# Patient Record
Sex: MALE | Race: WHITE | NOT HISPANIC OR LATINO | Employment: OTHER | URBAN - METROPOLITAN AREA
[De-identification: names, ages, dates, MRNs, and addresses within clinical notes are randomized per-mention and may not be internally consistent; named-entity substitution may affect disease eponyms.]

---

## 2022-11-10 ENCOUNTER — OFFICE VISIT (OUTPATIENT)
Dept: PRIMARY CARE CLINIC | Facility: CLINIC | Age: 32
End: 2022-11-10
Payer: COMMERCIAL

## 2022-11-10 VITALS
DIASTOLIC BLOOD PRESSURE: 80 MMHG | OXYGEN SATURATION: 97 % | HEART RATE: 85 BPM | WEIGHT: 192 LBS | BODY MASS INDEX: 26.88 KG/M2 | SYSTOLIC BLOOD PRESSURE: 116 MMHG | TEMPERATURE: 99 F | HEIGHT: 71 IN | RESPIRATION RATE: 18 BRPM

## 2022-11-10 DIAGNOSIS — Z20.828 EXPOSURE TO INFLUENZA: Primary | ICD-10-CM

## 2022-11-10 DIAGNOSIS — R05.9 COUGH, UNSPECIFIED TYPE: ICD-10-CM

## 2022-11-10 PROCEDURE — 99203 OFFICE O/P NEW LOW 30 MIN: CPT | Mod: S$GLB,,, | Performed by: FAMILY MEDICINE

## 2022-11-10 PROCEDURE — 99203 PR OFFICE/OUTPT VISIT, NEW, LEVL III, 30-44 MIN: ICD-10-PCS | Mod: S$GLB,,, | Performed by: FAMILY MEDICINE

## 2022-11-10 RX ORDER — OSELTAMIVIR PHOSPHATE 75 MG/1
75 CAPSULE ORAL 2 TIMES DAILY
Qty: 10 CAPSULE | Refills: 0 | Status: SHIPPED | OUTPATIENT
Start: 2022-11-10 | End: 2022-11-15

## 2022-11-10 NOTE — PROGRESS NOTES
"  Subjective:       Patient ID: Deep Ramey is a 32 y.o. male.    Chief Complaint: Establish Care, Fatigue (A few days), and Cough (" A little")    32-year-old male presents to clinic complaining of flu-like symptoms for a few days.  Patient is new to me today.  States all of his children and his wife tested positive for flu within this past week.  Patient complains of weakness, lightheadedness, cough, fever with temperature of 99° + at home and chills.  Discussed with patient reading of a true temperature.  He was afebrile today in office.  Patient states he does have some body aches denies any headaches, nausea, vomiting, diarrhea.  States that his family members have had some of these symptoms.  Discussed with patient since he has been exposed to the flu virus, I will go ahead and treat him today.  Patient is in agreement to this plan of care.  Patient's flu and COVID test both returned negative today.  Patient states he has already called [facility] and they do have Tamiflu in stock. Discussed with patient I would like him to follow-up in 1 month to establish care and have baseline lab drawn. No further complaints noted.      Current Outpatient Medications:     oseltamivir (TAMIFLU) 75 MG capsule, Take 1 capsule (75 mg total) by mouth 2 (two) times daily. for 5 days, Disp: 10 capsule, Rfl: 0    Review of patient's allergies indicates:  No Known Allergies    History reviewed. No pertinent past medical history.    History reviewed. No pertinent surgical history.    History reviewed. No pertinent family history.    Social History     Tobacco Use    Smoking status: Never     Passive exposure: Never    Smokeless tobacco: Never   Substance Use Topics    Alcohol use: Yes    Drug use: Never       Review of Systems   Constitutional:  Positive for chills, diaphoresis, fatigue and fever. Negative for activity change, appetite change and unexpected weight change.   HENT:  Negative for ear discharge, ear pain, hearing " "loss and tinnitus.    Eyes:  Negative for photophobia, pain and visual disturbance.   Respiratory:  Positive for cough. Negative for shortness of breath and wheezing.    Cardiovascular:  Negative for chest pain and palpitations.   Gastrointestinal:  Negative for abdominal pain, blood in stool, constipation, diarrhea, nausea and vomiting.   Genitourinary:  Negative for dysuria and hematuria.   Neurological:  Negative for weakness and headaches.       Current Medications:   Home Psychiatric Meds:   Psychotherapeutics (From admission, onward)      None                Objective:      Vitals:    11/10/22 0901   BP: 116/80   BP Location: Left arm   Patient Position: Sitting   BP Method: Medium (Manual)   Pulse: 85   Resp: 18   Temp: 98.8 °F (37.1 °C)   TempSrc: Oral   SpO2: 97%   Weight: 87.1 kg (192 lb)   Height: 5' 11" (1.803 m)     Physical Exam  Vitals reviewed.   Constitutional:       Appearance: Normal appearance. He is ill-appearing.   HENT:      Head: Normocephalic.      Right Ear: Tympanic membrane, ear canal and external ear normal.      Left Ear: Tympanic membrane, ear canal and external ear normal.      Nose: Nose normal.      Mouth/Throat:      Mouth: Mucous membranes are moist.   Eyes:      Pupils: Pupils are equal, round, and reactive to light.   Cardiovascular:      Rate and Rhythm: Normal rate and regular rhythm.      Pulses: Normal pulses.      Heart sounds: Normal heart sounds.   Pulmonary:      Effort: Pulmonary effort is normal.      Breath sounds: Normal breath sounds.   Abdominal:      General: Bowel sounds are normal.      Palpations: Abdomen is soft.   Musculoskeletal:         General: Normal range of motion.      Cervical back: Normal range of motion and neck supple.   Skin:     General: Skin is warm and dry.   Neurological:      General: No focal deficit present.      Mental Status: He is alert and oriented to person, place, and time.   Psychiatric:         Mood and Affect: Mood normal.         " Behavior: Behavior normal.       No results found for: WBC, HGB, HCT, PLT, CHOL, TRIG, HDL, LDLDIRECT, ALT, AST, NA, K, CL, CREATININE, BUN, CO2, TSH, PSA, INR, GLUF, HGBA1C, MICROALBUR   Assessment:       1. Exposure to influenza    2. Cough, unspecified type          Plan:         Problem List Items Addressed This Visit          Pulmonary    Cough     - over-the-counter antitussive medication  - both COVID and flu tests returned within normal limits today, notified patient before he left office         Relevant Orders    POCT COVID-19 Rapid Screening    POCT INFLUENZA A/B       ID    Exposure to influenza - Primary     - given patient's close contact, and his presenting symptoms will go ahead and treat with Tamiflu today  - instructed patient to return to office if symptoms worsen or new symptoms arise  - patient should follow-up in 1 month to have baseline lab work drawn and establish care         Relevant Medications    oseltamivir (TAMIFLU) 75 MG capsule         Follow up in about 1 month (around 12/10/2022), or if symptoms worsen or fail to improve.        Approximately 30 minutes were spent on this encounter. This includes face to face time and non-face to face time preparing to see the patient (eg, review of tests), obtaining and/or reviewing separately obtained history, documenting clinical information in the electronic or other health record, independently interpreting results and communicating results to the patient/family/caregiver, or care coordinator.    Instructed patient that if symptoms fail to improve or worsen patient should seek immediate medical attention or report to the nearest emergency department. Patient expressed verbal agreement and understanding to this plan of care.     This note was created using [employer] voice recognition software that occasionally misinterpreted phrases or words.     DOROTHY Aaron MD

## 2022-11-10 NOTE — ASSESSMENT & PLAN NOTE
- given patient's close contact, and his presenting symptoms will go ahead and treat with Tamiflu today  - instructed patient to return to office if symptoms worsen or new symptoms arise  - patient should follow-up in 1 month to have baseline lab work drawn and establish care

## 2022-11-22 ENCOUNTER — OFFICE VISIT (OUTPATIENT)
Dept: PRIMARY CARE CLINIC | Facility: CLINIC | Age: 32
End: 2022-11-22
Payer: COMMERCIAL

## 2022-11-22 VITALS
DIASTOLIC BLOOD PRESSURE: 76 MMHG | HEIGHT: 71 IN | TEMPERATURE: 98 F | WEIGHT: 190.63 LBS | BODY MASS INDEX: 26.69 KG/M2 | RESPIRATION RATE: 18 BRPM | SYSTOLIC BLOOD PRESSURE: 118 MMHG

## 2022-11-22 DIAGNOSIS — R11.2 NAUSEA AND VOMITING, UNSPECIFIED VOMITING TYPE: ICD-10-CM

## 2022-11-22 DIAGNOSIS — R53.81 MALAISE: ICD-10-CM

## 2022-11-22 DIAGNOSIS — A08.4 VIRAL GASTROENTERITIS: Primary | ICD-10-CM

## 2022-11-22 PROBLEM — R05.9 COUGH: Status: RESOLVED | Noted: 2022-11-10 | Resolved: 2022-11-22

## 2022-11-22 LAB
CTP QC/QA: YES
CTP QC/QA: YES
FLUAV AG NPH QL: NEGATIVE
FLUBV AG NPH QL: NEGATIVE
SARS-COV-2 RDRP RESP QL NAA+PROBE: NEGATIVE

## 2022-11-22 PROCEDURE — 99213 PR OFFICE/OUTPT VISIT, EST, LEVL III, 20-29 MIN: ICD-10-PCS | Mod: S$GLB,,, | Performed by: FAMILY MEDICINE

## 2022-11-22 PROCEDURE — 87635: ICD-10-PCS | Mod: QW,S$GLB,, | Performed by: FAMILY MEDICINE

## 2022-11-22 PROCEDURE — 87635 SARS-COV-2 COVID-19 AMP PRB: CPT | Mod: QW,S$GLB,, | Performed by: FAMILY MEDICINE

## 2022-11-22 PROCEDURE — 87804 INFLUENZA ASSAY W/OPTIC: CPT | Mod: QW,,, | Performed by: FAMILY MEDICINE

## 2022-11-22 PROCEDURE — 87804 POCT INFLUENZA A/B: ICD-10-PCS | Mod: 59,QW,, | Performed by: FAMILY MEDICINE

## 2022-11-22 PROCEDURE — 99213 OFFICE O/P EST LOW 20 MIN: CPT | Mod: S$GLB,,, | Performed by: FAMILY MEDICINE

## 2022-11-22 RX ORDER — ONDANSETRON 4 MG/1
4 TABLET, FILM COATED ORAL EVERY 8 HOURS PRN
Qty: 40 TABLET | Refills: 0 | Status: SHIPPED | OUTPATIENT
Start: 2022-11-22 | End: 2022-12-02

## 2022-11-22 NOTE — PROGRESS NOTES
Subjective:       Patient ID: Deep Ramey is a 32 y.o. male.    Chief Complaint: Headache (1 day), Emesis (1 day), and Diarrhea (1 day)    32-year-old male presents to clinic complaining of body aches, nausea, vomiting, diarrhea, headache, chills, sweats, runny nose.  Patient states last night he started having upset stomach and began vomiting and having diarrhea.  States he had approximately 4 watery bowel movements and 4 episodes of vomiting.  Patient has not had any episodes of vomiting or diarrhea this morning.  Patient states he is unable to keep anything down.  States he woke up with a headache this morning, but believes it is from dehydration due to vomiting and diarrhea.  Patient denies a fever, loss of taste or smell, cough today.  Patient states that his small child has been sick and fussy over the past few days, states he possibly could have gotten something from him.  Came into the office today so he can get things taken care of before the holidays.  Discussed with him we will COVID and flu test him today.  No further complaints noted        Current Outpatient Medications:     ondansetron (ZOFRAN) 4 MG tablet, Take 1 tablet (4 mg total) by mouth every 8 (eight) hours as needed for Nausea., Disp: 40 tablet, Rfl: 0    Review of patient's allergies indicates:  No Known Allergies    History reviewed. No pertinent past medical history.    History reviewed. No pertinent surgical history.    History reviewed. No pertinent family history.    Social History     Tobacco Use    Smoking status: Never     Passive exposure: Never    Smokeless tobacco: Never   Substance Use Topics    Alcohol use: Yes    Drug use: Never       Review of Systems   Constitutional:  Positive for appetite change, chills, diaphoresis and fatigue. Negative for activity change, fever and unexpected weight change.   HENT:  Negative for ear discharge, ear pain, hearing loss and tinnitus.    Eyes:  Negative for photophobia, pain and visual  "disturbance.   Respiratory:  Negative for cough, shortness of breath and wheezing.    Cardiovascular:  Negative for chest pain and palpitations.   Gastrointestinal:  Positive for diarrhea, nausea and vomiting. Negative for abdominal pain, blood in stool and constipation.   Genitourinary:  Negative for dysuria and hematuria.   Neurological:  Positive for headaches. Negative for weakness.       Current Medications:   Home Psychiatric Meds:   Psychotherapeutics (From admission, onward)      None                Objective:      Vitals:    11/22/22 0814   BP: 118/76   BP Location: Left arm   Patient Position: Sitting   BP Method: Medium (Manual)   Resp: 18   Temp: 98.2 °F (36.8 °C)   TempSrc: Oral   Weight: 86.5 kg (190 lb 9.6 oz)   Height: 5' 11" (1.803 m)     Physical Exam      No results found for: WBC, HGB, HCT, PLT, CHOL, TRIG, HDL, LDLDIRECT, ALT, AST, NA, K, CL, CREATININE, BUN, CO2, TSH, PSA, INR, GLUF, HGBA1C, MICROALBUR   Assessment:       1. Viral gastroenteritis    2. Nausea and vomiting, unspecified vomiting type    3. Malaise          Plan:         Problem List Items Addressed This Visit          ID    Viral gastroenteritis - Primary     - encouraged patient to increase oral hydration with water and Pedialyte  - brat diet  - notify office if symptoms have not resolved by the end of the week, or worsen  - patient should return to clinic if no resolution within 1 week  - follow-up for regularly scheduled appointments, lab draw at subsequent appointments  - notify patient of negative flu and COVID test before he left office today         Relevant Orders    POCT INFLUENZA A/B (Completed)    POCT COVID-19 Rapid Screening (Completed)       GI    Nausea and vomiting     - hydration as above         Relevant Medications    ondansetron (ZOFRAN) 4 MG tablet    Other Relevant Orders    POCT INFLUENZA A/B (Completed)    POCT COVID-19 Rapid Screening (Completed)     Other Visit Diagnoses       Malaise        Relevant " Orders    POCT INFLUENZA A/B (Completed)    POCT COVID-19 Rapid Screening (Completed)              Follow up by the end of the week.  Keep scheduled follow-up appt in December.        Approximately 20 minutes were spent on this encounter. This includes face to face time and non-face to face time preparing to see the patient (eg, review of tests), obtaining and/or reviewing separately obtained history, documenting clinical information in the electronic or other health record, independently interpreting results and communicating results to the patient/family/caregiver, or care coordinator.    Instructed patient that if symptoms fail to improve or worsen patient should seek immediate medical attention or report to the nearest emergency department. Patient expressed verbal agreement and understanding to this plan of care.     This note was created using [employer] voice recognition software that occasionally misinterpreted phrases or words.     DOROTHY Aaron MD